# Patient Record
(demographics unavailable — no encounter records)

---

## 2024-11-08 NOTE — ASSESSMENT
[FreeTextEntry1] : 11/08/2024: Encouraged to quit smoking.   Discussed treatment options for enlarged prostate.   Patient will consider his options.   Recommended to return to transrectal ultrasound.   Will schedule next available appointment.   Return to office for scheduled appointment.

## 2024-11-08 NOTE — HISTORY OF PRESENT ILLNESS
[FreeTextEntry1] : Primary care physician: Dr. Mita García.  11/08/2024: 67-year-old male presents for follow-up.  Taking Silodosin. Has not noticed any major difference in urination.   Continues to have nasal congestion.  Had called and informed of biopsy result from bladder biopsy.  Benign urothelial mucosa with chronic inflammation.   Negative for malignancy.   Discussed the pathology.   09/10/2024: 67-year-old male presents for cystoscopy.   After last visit, patient did try Flomax for a few days and did not notice any major difference in urination.  Had nasal congestion and felt like he had a flu, so stopped taking it.   No new complaints.  Denies dysuria, hematuria, lower abdominal or flank pain, nausea, vomiting, fever, chills or rigors.  After last visit, was informed of low free testosterone and elevated PSA. Patient did do repeat labs.   Seen on 8/6/2024 for microhematuria and epididymal cyst. Patient went to [facility] on 7/16/2024 with right-sided abdominal pain and was found to have microhematuria and epididymal cyst. Patient on and off has suprapubic pain and right testicle pain. Denied gross hematuria, no history of kidney stones or recurrent urinary tract infections.  Smoker. 1 PPD for 40 years. No occupational exposure Reported variable stream, urinated every 2-4 hours or so during the day and nocturia of 1-2 times. Endorsed on and off hesitancy and sense of incomplete emptying. Denied intermittency, urgency, incontinence or sense of incomplete emptying. Had tried Flomax in the past without much improvement in urination and also had retrograde ejaculation. Had erectile dysfunction, rated erection 2/5.  Not sexually active. Also complained of low libido.  No family history of prostate cancer. Father was a smoker and had pancreatic cancer.  7/16/2024: WBC: 7.3 Creatinine: 1.02 Urinalysis: RBCs 5  Scrotal ultrasound: 9 mm sized right epididymal cyst, otherwise, negative study  CT scan abdomen and pelvis with oral and intravenous contrast: KIDNEYS/URETERS: Bilateral renal cysts. BLADDER: Within normal limits. REPRODUCTIVE ORGANS: Prostate is enlarged.

## 2024-11-08 NOTE — PHYSICAL EXAM
[Normal Appearance] : normal appearance [General Appearance - In No Acute Distress] : no acute distress [] : no respiratory distress [Normal Station and Gait] : the gait and station were normal for the patient's age [Oriented To Time, Place, And Person] : oriented to person, place, and time [de-identified] : normal peripheral circulation

## 2024-11-08 NOTE — LETTER BODY
[Dear  ___] : Dear  [unfilled], [Courtesy Letter:] : I had the pleasure of seeing your patient, [unfilled], in my office today. [Please see my note below.] : Please see my note below. [Sincerely,] : Sincerely, [FreeTextEntry3] : Vitor Cobb MD  of Urology [facility]  [facility] Offices: [address] [facility], [address] [place] [address], [facility] [address] [facility], [place] [address]  TEL: [phone] FAX: [phone]

## 2024-11-08 NOTE — END OF VISIT
[Time Spent: ___ minutes] : I have spent [unfilled] minutes of time on the encounter which excludes teaching and separately reported services. [FreeTextEntry3] : Parts of this note were generated by using [employer] services and Dragon medical dictation software.  A reasonable effort was made to proofread and correct its contents, but typos and mistakes may still remain. If there are any questions or points of clarification needed, please contact my office.   Prior to appointment and during encounter with patient extensive medical records were reviewed including but not limited to, hospital records, outpatient records, imaging results and lab data if available. During this appointment the patient was examined, diagnoses were discussed and explained in a face-to-face manner. In addition, extensive time was spent reviewing aforementioned diagnostic studies. Counseling including test results, differential diagnoses, treatment options, risk and benefits, lifestyle changes, current condition, and current medications was performed. Patient's questions and concerns were addressed. Patient verbalized understanding of the treatment plan. Time spent is for reviewing chart, labs and images if available, counseling and care coordination.

## 2024-11-08 NOTE — ADDENDUM
[FreeTextEntry1] :  Margarita PRESTON assisted in documentation on 11/08/2024  acting as a scribe for Dr. Vitor Cobb.

## 2024-12-06 NOTE — HISTORY OF PRESENT ILLNESS
[FreeTextEntry1] : Primary care physician: Dr. Mita García.  Stopped taking silodosin, no response  11/08/2024: 67-year-old male presents for follow-up.  Taking Silodosin 4 mg. Did not notice any major difference in urination.   Continues to have nasal congestion.  Had called and informed of biopsy result from bladder biopsy: benign urothelial mucosa with chronic inflammation.   Negative for malignancy.    Status post Cystoscopy and bladder biopsy with fulguration on 10/21/2024 at [facility].  Seen on 09/10/2024 for cystoscopy: No bladder tumor visualized.  The bladder wall demonstrated a grade 2 trabeculation.    Tri lobar prostatic enlargement: moderately enlarged lateral lobes and a small median lobe, with coaptation. Moderate Circumferential intravesical prostatic protrusion. Dilated blood vessels overlying mucosa at bladder neck. 2 cm posterior bladder wall erythematous mucosa.   Did try Flomax for a few days and did not notice any major difference in urination.  Had nasal congestion and felt like he had a flu, so stopped taking it.   After last visit, was informed of low free testosterone and elevated PSA. Patient did do repeat labs.   Seen on 8/6/2024 for microhematuria and epididymal cyst. Patient went to [facility] on 7/16/2024 with right-sided abdominal pain and was found to have microhematuria and epididymal cyst. Patient on and off has suprapubic pain and right testicle pain. Denied gross hematuria, no history of kidney stones or recurrent urinary tract infections.  Smoker. 1 PPD for 40 years. No occupational exposure Reported variable stream, urinated every 2-4 hours or so during the day and nocturia of 1-2 times. Endorsed on and off hesitancy and sense of incomplete emptying. Denied intermittency, urgency, incontinence or sense of incomplete emptying. Had tried Flomax in the past without much improvement in urination and also had retrograde ejaculation. Had erectile dysfunction, rated erection 2/5.  Not sexually active. Also complained of low libido.  No family history of prostate cancer. Father was a smoker and had pancreatic cancer.  7/16/2024: WBC: 7.3 Creatinine: 1.02 Urinalysis: RBCs 5  Scrotal ultrasound: 9 mm sized right epididymal cyst, otherwise, negative study  CT scan abdomen and pelvis with oral and intravenous contrast: KIDNEYS/URETERS: Bilateral renal cysts. BLADDER: Within normal limits. REPRODUCTIVE ORGANS: Prostate is enlarged.

## 2024-12-06 NOTE — LETTER BODY
[Dear  ___] : Dear  [unfilled], [Courtesy Letter:] : I had the pleasure of seeing your patient, [unfilled], in my office today. [Please see my note below.] : Please see my note below. [Sincerely,] : Sincerely, [FreeTextEntry3] : Vitor Cobb MD  of Urology [facility]  [facility] Offices: [address] [facility], [address] [place] [address], [facility] [address] [facility], [place] [address]  TEL: [phone] FAX: [phone]

## 2024-12-06 NOTE — ASSESSMENT
[FreeTextEntry1] : Benign Prostatic Hyperplasia: Again, discussed treatment options: continued medical management with alpha blocker and or 5 alpha reductase inhibitors with or without Clean intermittent catheterization/Indwelling Wolf catheter Vs Surgery: Transurethral resection/vaporization/ablation including Aquablation of Prostate and Simple prostatectomy: open Vs robotic after appropriate work up. Also discussed emerging minimally invasive surgical therapies: Prostatic urethral lift (Urolift), Water vapor thermal therapy (Rezum) and Transurethral anterior prostate commissurotomy and drug delivery (Optilume).  Discussed risks and benefits of each. Patient will consider his options.  Patient agreeable for further workup with transrectal prostate ultrasound.

## 2024-12-06 NOTE — END OF VISIT
[FreeTextEntry3] : Parts of this note were generated by using [employer] services and Dragon medical dictation software.  A reasonable effort was made to proofread and correct its contents, but typos and mistakes may still remain. If there are any questions or points of clarification needed, please contact my office.   Prior to appointment and during encounter with patient extensive medical records were reviewed including but not limited to, hospital records, outpatient records, imaging results and lab data if available. During this appointment the patient was examined, diagnoses were discussed and explained in a face-to-face manner. In addition, extensive time was spent reviewing aforementioned diagnostic studies. Counseling including test results, differential diagnoses, treatment options, risk and benefits, lifestyle changes, current condition, and current medications was performed. Patient's questions and concerns were addressed. Patient verbalized understanding of the treatment plan. Time spent is for reviewing chart, labs and images if available, counseling and care coordination.  [Time Spent: ___ minutes] : I have spent [unfilled] minutes of time on the encounter which excludes teaching and separately reported services.

## 2025-01-30 NOTE — END OF VISIT
[Time Spent: ___ minutes] : I have spent [unfilled] minutes of time on the encounter which excludes teaching and separately reported services. [FreeTextEntry3] : Parts of this note were generated by using [employer] services and Dragon medical dictation software.  A reasonable effort was made to proofread and correct its contents, but typos and mistakes may still remain. If there are any questions or points of clarification needed, please contact my office.   Prior to appointment and during encounter with patient extensive medical records were reviewed including but not limited to, hospital records, outpatient records, imaging results and lab data if available. During this appointment the patient was examined, diagnoses were discussed and explained in a face-to-face manner. In addition, extensive time was spent reviewing aforementioned diagnostic studies. Counseling including test results, differential diagnoses, treatment options, risk and benefits, lifestyle changes, current condition, and current medications was performed. Patient's questions and concerns were addressed. Patient verbalized understanding of the treatment plan. Time spent is for reviewing chart, labs and images if available, counseling and care coordination.

## 2025-01-30 NOTE — ADDENDUM
[FreeTextEntry1] :  Margarita PRESTON assisted in documentation on 01/30/2025  acting as a scribe for Dr. Vitor Cobb. .

## 2025-01-30 NOTE — HISTORY OF PRESENT ILLNESS
[FreeTextEntry1] : This telephonic visit was provided via audio only technology. The patient, QUINTIN BLACKBURN, was located at home, [address] [facility], [place] [address] , at the time of the visit. The provider, MIYA TOBIN, was located at the medical office located in [place]  at the time of the visit. The patient, QUINTIN BLACKBURN and Provider participated in the telephonic visit.   Verbal consent for telephonic services was given on January 29, 2025 by the patient, QUINTIN BLACKBURN.  Primary care physician: Dr. Mita García.  01/29/2025: 67-year-old male for telephone appointment to discuss further management.   Patient was last seen in 12/2024, when he has prostate ultrasound.    Patient has considered his options and would like to proceed with Rezum.  Same urination. No changes.   Seen on 12/6/2024 for follow-up and Prostate Ultrasound.  Stopped taking Silodosin. Did not notice any major difference in urination.   Had nasal congestion.  Prostate Ultrasound: Prostate volume: 59.57 cc.   Status post Cystoscopy and bladder biopsy with fulguration on 10/21/2024 at [facility]. Pathology: benign urothelial mucosa with chronic inflammation. Negative for malignancy.    PSA on 8/9/24 was 6.94, subsequent PSA on 9/6/24 3.33/19%.   Seen on 09/10/2024 for cystoscopy: No bladder tumor visualized.  The bladder wall demonstrated a grade 2 trabeculation.    Tri lobar prostatic enlargement: moderately enlarged lateral lobes and a small median lobe, with coaptation. Moderate Circumferential intravesical prostatic protrusion. Dilated blood vessels overlying mucosa at bladder neck. 2 cm posterior bladder wall erythematous mucosa.   Did try Flomax for a few days and did not notice any major difference in urination.  Had nasal congestion and felt like he had a flu, so stopped taking it.   After last visit, was informed of low free testosterone and elevated PSA. Patient did do repeat labs.   Seen on 8/6/2024 for microhematuria and epididymal cyst. Patient went to [facility] on 7/16/2024 with right-sided abdominal pain and was found to have microhematuria and epididymal cyst. Patient on and off has suprapubic pain and right testicle pain. Denied gross hematuria, no history of kidney stones or recurrent urinary tract infections.  Smoker. 1 PPD for 40 years. No occupational exposure Reported variable stream, urinated every 2-4 hours or so during the day and nocturia of 1-2 times. Endorsed on and off hesitancy and sense of incomplete emptying. Denied intermittency, urgency, incontinence or sense of incomplete emptying. Had tried Flomax in the past without much improvement in urination and also had retrograde ejaculation. Had erectile dysfunction, rated erection 2/5.  Not sexually active. Also complained of low libido.  No family history of prostate cancer. Father was a smoker and had pancreatic cancer.  7/16/2024: WBC: 7.3 Creatinine: 1.02 Urinalysis: RBCs 5  Scrotal ultrasound: 9 mm sized right epididymal cyst, otherwise, negative study  CT scan abdomen and pelvis with oral and intravenous contrast: KIDNEYS/URETERS: Bilateral renal cysts. BLADDER: Within normal limits. REPRODUCTIVE ORGANS: Prostate is enlarged.

## 2025-01-30 NOTE — ASSESSMENT
[FreeTextEntry1] : 01/29/2025: Again, discussed treatment options and procedures, including Rezum and OptiLume.   Patient will review the procedures.   At this time, patient would like to be scheduled for Rezum at next available appointment.  If he changes his mind and would prefer OptiLume, he will call and let us know.    Return to office for scheduled appointment.

## 2025-01-30 NOTE — LETTER BODY
[Dear  ___] : Dear  [unfilled], [Courtesy Letter:] : I had the pleasure of seeing your patient, [unfilled], in my office today. [Please see my note below.] : Please see my note below. [Sincerely,] : Sincerely, [FreeTextEntry3] : Vitor Cobb MD  of Urology [facility]  [facility] Offices: [address]", [address] [place] [address], [facility] [address] [facility], [place] [address]  TEL: [phone] FAX: [phone]

## 2025-03-13 NOTE — PHYSICAL EXAM
[General Appearance - Well Developed] : well developed [General Appearance - Well Nourished] : well nourished [Heart Rate And Rhythm] : heart rate and rhythm were normal [] : no respiratory distress [Abdomen Soft] : soft [Normal Station and Gait] : the gait and station were normal for the patient's age [Skin Color & Pigmentation] : normal skin color and pigmentation [No Focal Deficits] : no focal deficits [de-identified] : pt deferred exam

## 2025-03-13 NOTE — REVIEW OF SYSTEMS
[Abdominal Pain] : abdominal pain [see HPI] : see HPI [Poor quality erections] : Poor quality erections [Seen by urologist before (Name)  ___] : Preciously seen by a urologist: [unfilled] [Wake up at night to urinate  How many times?  ___] : wakes up to urinate [unfilled] times during the night [Bladder pressure] : experiences bladder pressure [Slow urine stream] : slow urine stream [Negative] : Heme/Lymph

## 2025-03-13 NOTE — HISTORY OF PRESENT ILLNESS
[Urinary Urgency] : urinary urgency [Nocturia] : nocturia [Weak Stream] : weak stream [Erectile Dysfunction] : Erectile Dysfunction [FreeTextEntry1] : CC: BPH   QUINTIN BLACKBURN is a 68 year male hx of BPH for many years. MR BLACKBURN has tried flomax and silodosin in the past with no improvements in symptoms. MR BLACKBURN endorses urinary urgency, weak stream and nocturia. Denies hematuria, flank pain, straining, or urinary frequency. Presents today intrested in MISTs for BPH     The patient is sexually active SERINA 10  Smoker 1 PPD for 40 years                [Urinary Incontinence] : no urinary incontinence [Urinary Frequency] : no urinary frequency [Straining] : no straining [Intermittency] : no intermittency [Post-Void Dribbling] : no post-void dribbling

## 2025-03-13 NOTE — ASSESSMENT
[FreeTextEntry1] : Uroflow(BPH ): low volume PVR(BPH ) : 4ml     After a discussion of his urologic history and exam, and a review the urological issues,   1.BPH/LUTS -  patient to drop off urine sample for UA/UC/Cytology next week. EMY w/ Dr. Cobb (9/2024)-trilobar prostate lateral lobes with coaptation. Moderate circumferential intravesical prostatic protrusion. TRUS: 59.57cc prostate.   A review the issues of BPH and various treatments including medical therapy, minimally invasive therapies to various types of prostatectomy. The patients is interested in Optilume. As previously discussed, the risk and benefits were reviewed including infection, bleeding, sexual dysfunction, incontinence, strictures, retrograde ejaculation, failure of therapy, repeat procedure due to inadequate resection, retention and persistence of symptoms. It is understood that medical comorbidities, size of prostate, anticoagulation status, preexisting condition such as bladder dysfunction, bladder diverticula and others influence the risk and success of the procedure. He will undergo preoperative testing and is being scheduled for the procedure.   2. Erectile Dysfunction - SERINA 10 defers treatment    3. PSA Screening- previous PSA 3.33 (9/2024) will continue to monitor  The risks and benefits of the medication(s) and/or treatment plan including various surgical therapies were discussed in detail with the patient who understands and wishes to proceed with plan. Inclusive of discussion were the impact of various therapies on sexual function, incontinence and other relevant quality of life issues. More than 50% was spent on counseling/coordination the care of the patient, regarding treatment options/surgical management.  I have spent 40 minutes of time on the encounter which excludes teaching time and/or separately reported services